# Patient Record
Sex: MALE | URBAN - METROPOLITAN AREA
[De-identification: names, ages, dates, MRNs, and addresses within clinical notes are randomized per-mention and may not be internally consistent; named-entity substitution may affect disease eponyms.]

---

## 2023-03-29 ENCOUNTER — OFFICE VISIT (OUTPATIENT)
Facility: LOCATION | Age: 38
End: 2023-03-29
Payer: COMMERCIAL

## 2023-03-29 DIAGNOSIS — H40.013 OPEN ANGLE WITH BORDERLINE FINDINGS, LOW RISK, BILATERAL: Primary | ICD-10-CM

## 2023-03-29 PROCEDURE — 99215 OFFICE O/P EST HI 40 MIN: CPT | Performed by: OPHTHALMOLOGY

## 2023-03-29 PROCEDURE — 92133 CPTRZD OPH DX IMG PST SGM ON: CPT | Performed by: OPHTHALMOLOGY

## 2023-03-29 PROCEDURE — 92083 EXTENDED VISUAL FIELD XM: CPT | Performed by: OPHTHALMOLOGY

## 2023-03-29 ASSESSMENT — INTRAOCULAR PRESSURE
OD: 16
OS: 16

## 2023-03-29 NOTE — IMPRESSION/PLAN
Impression: 4 months follow up with OCT and VF. 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 FOHx: Positive to glaucoma (Mother). PMHx: HDL. Eye meds: None. TMAX: Not established. Target IOP: Not established. Plan: Testing:
OCT/ONH 3/2023: OD bdl thin IN, OS bdl thin IN. Rim thinner OD. No definite progression. HVF 24-2  03/2023: OD unreliable, shallow central depressions, OS scattered changes. OU no definite progression. Pachy: 585/576. Gonio 10/2022: CBB 2+ 360 OU. Today:
IOP acceptable OU. OCT and VF performed and reviewed today. Informed that today examination and testing are stable, no detectable sign of glaucoma. Stressed the importance of regular eye exams to be able to detect early signs of glaucoma and initiate treatment promptly. Plan:
Monitor. RTC in 6 months with OCT ONH OU and gonio. Patient is interested in LASIK refractive surgery. Advised patient to hold off at this time given suspicious looking optic nerves.